# Patient Record
Sex: MALE | Race: WHITE | NOT HISPANIC OR LATINO | Employment: FULL TIME | URBAN - METROPOLITAN AREA
[De-identification: names, ages, dates, MRNs, and addresses within clinical notes are randomized per-mention and may not be internally consistent; named-entity substitution may affect disease eponyms.]

---

## 2019-02-25 ENCOUNTER — HISTORICAL (OUTPATIENT)
Dept: ADMINISTRATIVE | Facility: HOSPITAL | Age: 54
End: 2019-02-25

## 2019-02-25 LAB
ABS NEUT (OLG): 5 X10(3)/MCL (ref 2.1–9.2)
ALBUMIN SERPL-MCNC: 4.4 GM/DL (ref 3.4–5)
ALBUMIN/GLOB SERPL: 1.42 {RATIO} (ref 1.5–2.5)
ALP SERPL-CCNC: 52 UNIT/L (ref 38–126)
ALT SERPL-CCNC: 41 UNIT/L (ref 7–52)
APPEARANCE, UA: CLEAR
AST SERPL-CCNC: 26 UNIT/L (ref 15–37)
BACTERIA #/AREA URNS AUTO: ABNORMAL /HPF
BILIRUB SERPL-MCNC: 0.9 MG/DL (ref 0.2–1)
BILIRUB UR QL STRIP: NEGATIVE MG/DL
BILIRUBIN DIRECT+TOT PNL SERPL-MCNC: 0.2 MG/DL (ref 0–0.5)
BILIRUBIN DIRECT+TOT PNL SERPL-MCNC: 0.7 MG/DL
BUN SERPL-MCNC: 13 MG/DL (ref 7–18)
CALCIUM SERPL-MCNC: 8.8 MG/DL (ref 8.5–10)
CHLORIDE SERPL-SCNC: 103 MMOL/L (ref 98–107)
CHOLEST SERPL-MCNC: 126 MG/DL (ref 0–200)
CHOLEST/HDLC SERPL: 3.7 {RATIO}
CO2 SERPL-SCNC: 24 MMOL/L (ref 21–32)
COLOR UR: ABNORMAL
CREAT SERPL-MCNC: 0.99 MG/DL (ref 0.6–1.3)
ERYTHROCYTE [DISTWIDTH] IN BLOOD BY AUTOMATED COUNT: 12.9 % (ref 11.5–17)
GLOBULIN SER-MCNC: 3.1 GM/DL (ref 1.2–3)
GLUCOSE (UA): NEGATIVE MG/DL
GLUCOSE SERPL-MCNC: 110 MG/DL (ref 74–106)
HCT VFR BLD AUTO: 47.6 % (ref 42–52)
HDLC SERPL-MCNC: 34 MG/DL (ref 35–60)
HGB BLD-MCNC: 15.2 GM/DL (ref 14–18)
HGB UR QL STRIP: NEGATIVE UNIT/L
KETONES UR QL STRIP: NEGATIVE MG/DL
LDLC SERPL CALC-MCNC: 69 MG/DL (ref 0–129)
LEUKOCYTE ESTERASE UR QL STRIP: NEGATIVE UNIT/L
LYMPHOCYTES # BLD AUTO: 2.4 X10(3)/MCL (ref 0.6–3.4)
LYMPHOCYTES NFR BLD AUTO: 29.6 % (ref 13–40)
MCH RBC QN AUTO: 28.9 PG (ref 27–31.2)
MCHC RBC AUTO-ENTMCNC: 32 GM/DL (ref 32–36)
MCV RBC AUTO: 90 FL (ref 80–94)
MONOCYTES # BLD AUTO: 0.8 X10(3)/MCL (ref 0.1–1.3)
MONOCYTES NFR BLD AUTO: 10 % (ref 0.1–24)
NEUTROPHILS NFR BLD AUTO: 60.4 % (ref 47–80)
NITRITE UR QL STRIP.AUTO: NEGATIVE
PH UR STRIP: 5.5 [PH]
PLATELET # BLD AUTO: 277 X10(3)/MCL (ref 130–400)
PMV BLD AUTO: 9.3 FL (ref 9.4–12.4)
POTASSIUM SERPL-SCNC: 4.3 MMOL/L (ref 3.5–5.1)
PROT SERPL-MCNC: 7.5 GM/DL (ref 6.4–8.2)
PROT UR QL STRIP: NEGATIVE MG/DL
PSA SERPL-MCNC: 0.86 NG/ML (ref 0–3.5)
RBC # BLD AUTO: 5.26 X10(6)/MCL (ref 4.7–6.1)
RBC #/AREA URNS HPF: ABNORMAL /HPF
SODIUM SERPL-SCNC: 138 MMOL/L (ref 136–145)
SP GR UR STRIP: >1.03
SQUAMOUS EPITHELIAL, UA: ABNORMAL /LPF
TRIGL SERPL-MCNC: 81 MG/DL (ref 30–150)
UROBILINOGEN UR STRIP-ACNC: 0.2 MG/DL
VLDLC SERPL CALC-MCNC: 16.2 MG/DL
WBC # SPEC AUTO: 8.2 X10(3)/MCL (ref 4.5–11.5)
WBC #/AREA URNS AUTO: ABNORMAL /[HPF]

## 2020-06-25 ENCOUNTER — HISTORICAL (OUTPATIENT)
Dept: ADMINISTRATIVE | Facility: HOSPITAL | Age: 55
End: 2020-06-25

## 2020-06-25 LAB
ABS NEUT (OLG): 5.1 X10(3)/MCL (ref 2.1–9.2)
ALBUMIN SERPL-MCNC: 4.6 GM/DL (ref 3.4–5)
ALBUMIN/GLOB SERPL: 1.39 {RATIO} (ref 1.5–2.5)
ALP SERPL-CCNC: 54 UNIT/L (ref 38–126)
ALT SERPL-CCNC: 38 UNIT/L (ref 7–52)
APPEARANCE, UA: ABNORMAL
AST SERPL-CCNC: 25 UNIT/L (ref 15–37)
BACTERIA #/AREA URNS AUTO: ABNORMAL /HPF
BILIRUB SERPL-MCNC: 0.9 MG/DL (ref 0.2–1)
BILIRUB UR QL STRIP: NEGATIVE MG/DL
BILIRUBIN DIRECT+TOT PNL SERPL-MCNC: 0.2 MG/DL (ref 0–0.5)
BILIRUBIN DIRECT+TOT PNL SERPL-MCNC: 0.7 MG/DL
BUN SERPL-MCNC: 10 MG/DL (ref 7–18)
CALCIUM SERPL-MCNC: 9.3 MG/DL (ref 8.5–10)
CHLORIDE SERPL-SCNC: 103 MMOL/L (ref 98–107)
CHOLEST SERPL-MCNC: 111 MG/DL (ref 0–200)
CHOLEST/HDLC SERPL: 3.1 {RATIO}
CO2 SERPL-SCNC: 25 MMOL/L (ref 21–32)
COLOR UR: ABNORMAL
CREAT SERPL-MCNC: 1.06 MG/DL (ref 0.6–1.3)
ERYTHROCYTE [DISTWIDTH] IN BLOOD BY AUTOMATED COUNT: 12.9 % (ref 11.5–17)
EST. AVERAGE GLUCOSE BLD GHB EST-MCNC: 120 MG/DL
GLOBULIN SER-MCNC: 3.3 GM/DL (ref 1.2–3)
GLUCOSE (UA): NEGATIVE MG/DL
GLUCOSE SERPL-MCNC: 99 MG/DL (ref 74–106)
HBA1C MFR BLD: 5.8 % (ref 4.4–6.4)
HCT VFR BLD AUTO: 48.4 % (ref 42–52)
HDLC SERPL-MCNC: 36 MG/DL (ref 35–60)
HGB BLD-MCNC: 15.9 GM/DL (ref 14–18)
HGB UR QL STRIP: NEGATIVE UNIT/L
KETONES UR QL STRIP: NEGATIVE MG/DL
LDLC SERPL CALC-MCNC: 64 MG/DL (ref 0–129)
LEUKOCYTE ESTERASE UR QL STRIP: NEGATIVE UNIT/L
LYMPHOCYTES # BLD AUTO: 1 X10(3)/MCL (ref 0.6–3.4)
LYMPHOCYTES NFR BLD AUTO: 13.9 % (ref 13–40)
MCH RBC QN AUTO: 28.1 PG (ref 27–31.2)
MCHC RBC AUTO-ENTMCNC: 33 GM/DL (ref 32–36)
MCV RBC AUTO: 86 FL (ref 80–94)
MONOCYTES # BLD AUTO: 1.1 X10(3)/MCL (ref 0.1–1.3)
MONOCYTES NFR BLD AUTO: 15.3 % (ref 0.1–24)
NEUTROPHILS NFR BLD AUTO: 70.8 % (ref 47–80)
NITRITE UR QL STRIP.AUTO: NEGATIVE
PH UR STRIP: 5.5 [PH]
PLATELET # BLD AUTO: 288 X10(3)/MCL (ref 130–400)
PMV BLD AUTO: 9.1 FL (ref 9.4–12.4)
POTASSIUM SERPL-SCNC: 4.4 MMOL/L (ref 3.5–5.1)
PROT SERPL-MCNC: 7.9 GM/DL (ref 6.4–8.2)
PROT UR QL STRIP: NEGATIVE MG/DL
PSA SERPL-MCNC: 1.72 NG/ML (ref 0–3.5)
RBC # BLD AUTO: 5.65 X10(6)/MCL (ref 4.7–6.1)
RBC #/AREA URNS HPF: ABNORMAL /HPF
SODIUM SERPL-SCNC: 139 MMOL/L (ref 136–145)
SP GR UR STRIP: >1.03
SQUAMOUS EPITHELIAL, UA: ABNORMAL /LPF
TRIGL SERPL-MCNC: 81 MG/DL (ref 30–150)
UROBILINOGEN UR STRIP-ACNC: 0.2 MG/DL
VLDLC SERPL CALC-MCNC: 16.2 MG/DL
WBC # SPEC AUTO: 7.2 X10(3)/MCL (ref 4.5–11.5)
WBC #/AREA URNS AUTO: ABNORMAL /[HPF]

## 2021-07-16 ENCOUNTER — HISTORICAL (OUTPATIENT)
Dept: ADMINISTRATIVE | Facility: HOSPITAL | Age: 56
End: 2021-07-16

## 2021-07-16 LAB
ABS NEUT (OLG): 5.2 X10(3)/MCL (ref 2.1–9.2)
ALBUMIN SERPL-MCNC: 4.8 GM/DL (ref 3.4–5)
ALBUMIN/GLOB SERPL: 1.37 {RATIO} (ref 1.5–2.5)
ALP SERPL-CCNC: 57 UNIT/L (ref 38–126)
ALT SERPL-CCNC: 42 UNIT/L (ref 7–52)
APPEARANCE, UA: CLEAR
AST SERPL-CCNC: 30 UNIT/L (ref 15–37)
BACTERIA #/AREA URNS AUTO: ABNORMAL /HPF
BILIRUB SERPL-MCNC: 0.8 MG/DL (ref 0.2–1)
BILIRUB UR QL STRIP: NEGATIVE MG/DL
BILIRUBIN DIRECT+TOT PNL SERPL-MCNC: 0.2 MG/DL (ref 0–0.5)
BILIRUBIN DIRECT+TOT PNL SERPL-MCNC: 0.6 MG/DL
BUN SERPL-MCNC: 11 MG/DL (ref 7–18)
CALCIUM SERPL-MCNC: 10.4 MG/DL (ref 8.5–10.1)
CHLORIDE SERPL-SCNC: 103 MMOL/L (ref 98–107)
CHOLEST SERPL-MCNC: 149 MG/DL (ref 0–200)
CHOLEST/HDLC SERPL: 3.5 {RATIO}
CO2 SERPL-SCNC: 28 MMOL/L (ref 21–32)
COLOR UR: YELLOW
CREAT SERPL-MCNC: 0.99 MG/DL (ref 0.6–1.3)
ERYTHROCYTE [DISTWIDTH] IN BLOOD BY AUTOMATED COUNT: 13.7 % (ref 11.5–17)
EST. AVERAGE GLUCOSE BLD GHB EST-MCNC: 114 MG/DL
GLOBULIN SER-MCNC: 3.5 GM/DL (ref 1.2–3)
GLUCOSE (UA): NEGATIVE MG/DL
GLUCOSE SERPL-MCNC: 113 MG/DL (ref 74–106)
HBA1C MFR BLD: 5.6 % (ref 4.4–6.4)
HCT VFR BLD AUTO: 50.8 % (ref 42–52)
HDLC SERPL-MCNC: 42 MG/DL (ref 35–60)
HGB BLD-MCNC: 16.5 GM/DL (ref 14–18)
HGB UR QL STRIP: NEGATIVE UNIT/L
KETONES UR QL STRIP: NEGATIVE MG/DL
LDLC SERPL CALC-MCNC: 102 MG/DL (ref 0–129)
LEUKOCYTE ESTERASE UR QL STRIP: NEGATIVE UNIT/L
LYMPHOCYTES # BLD AUTO: 2.2 X10(3)/MCL (ref 0.6–3.4)
LYMPHOCYTES NFR BLD AUTO: 27.4 % (ref 13–40)
MCH RBC QN AUTO: 27.8 PG (ref 27–31.2)
MCHC RBC AUTO-ENTMCNC: 32 GM/DL (ref 32–36)
MCV RBC AUTO: 86 FL (ref 80–94)
MONOCYTES # BLD AUTO: 0.5 X10(3)/MCL (ref 0.1–1.3)
MONOCYTES NFR BLD AUTO: 6.8 % (ref 0.1–24)
NEUTROPHILS NFR BLD AUTO: 65.8 % (ref 47–80)
NITRITE UR QL STRIP.AUTO: NEGATIVE
PH UR STRIP: 5.5 [PH]
PLATELET # BLD AUTO: 309 X10(3)/MCL (ref 130–400)
PMV BLD AUTO: 9.4 FL (ref 9.4–12.4)
POTASSIUM SERPL-SCNC: 5.3 MMOL/L (ref 3.5–5.1)
PROT SERPL-MCNC: 8.3 GM/DL (ref 6.4–8.2)
PROT UR QL STRIP: NEGATIVE MG/DL
PSA SERPL-MCNC: 2.08 NG/ML (ref 0–3.5)
RBC # BLD AUTO: 5.94 X10(6)/MCL (ref 4.7–6.1)
RBC #/AREA URNS HPF: ABNORMAL /HPF
SODIUM SERPL-SCNC: 140 MMOL/L (ref 136–145)
SP GR UR STRIP: >1.03
SQUAMOUS EPITHELIAL, UA: ABNORMAL /LPF
TRIGL SERPL-MCNC: 92 MG/DL (ref 30–150)
UROBILINOGEN UR STRIP-ACNC: 0.2 MG/DL
VLDLC SERPL CALC-MCNC: 18.4 MG/DL
WBC # SPEC AUTO: 7.9 X10(3)/MCL (ref 4.5–11.5)
WBC #/AREA URNS AUTO: ABNORMAL /[HPF]

## 2021-08-04 ENCOUNTER — HISTORICAL (OUTPATIENT)
Dept: ADMINISTRATIVE | Facility: HOSPITAL | Age: 56
End: 2021-08-04

## 2021-08-04 LAB
BUN SERPL-MCNC: 12 MG/DL (ref 7–18)
CALCIUM SERPL-MCNC: 9.1 MG/DL (ref 8.5–10.1)
CHLORIDE SERPL-SCNC: 104 MMOL/L (ref 98–107)
CO2 SERPL-SCNC: 28 MMOL/L (ref 21–32)
CREAT SERPL-MCNC: 0.93 MG/DL (ref 0.6–1.3)
CREAT/UREA NIT SERPL: 12.9
GLUCOSE SERPL-MCNC: 169 MG/DL (ref 74–106)
POTASSIUM SERPL-SCNC: 5 MMOL/L (ref 3.5–5.1)
SODIUM SERPL-SCNC: 138 MMOL/L (ref 136–145)

## 2022-04-10 ENCOUNTER — HISTORICAL (OUTPATIENT)
Dept: ADMINISTRATIVE | Facility: HOSPITAL | Age: 57
End: 2022-04-10

## 2022-04-27 VITALS
HEIGHT: 72 IN | SYSTOLIC BLOOD PRESSURE: 136 MMHG | BODY MASS INDEX: 34.31 KG/M2 | WEIGHT: 253.31 LBS | DIASTOLIC BLOOD PRESSURE: 78 MMHG

## 2022-07-23 PROBLEM — Z23 IMMUNIZATION DUE: Status: ACTIVE | Noted: 2022-07-23

## 2022-07-23 PROBLEM — E66.9 OBESITY: Status: ACTIVE | Noted: 2022-07-23

## 2022-07-23 PROBLEM — R73.9 HYPERGLYCEMIA: Status: ACTIVE | Noted: 2022-07-23

## 2022-07-23 PROBLEM — Z12.5 PROSTATE CANCER SCREENING: Status: ACTIVE | Noted: 2022-07-23

## 2022-07-23 PROBLEM — I10 PRIMARY HYPERTENSION: Status: ACTIVE | Noted: 2022-07-23

## 2022-07-23 PROBLEM — Z00.00 ENCOUNTER FOR WELLNESS EXAMINATION IN ADULT: Status: ACTIVE | Noted: 2022-07-23

## 2022-09-21 ENCOUNTER — HOSPITAL ENCOUNTER (OUTPATIENT)
Facility: HOSPITAL | Age: 57
Discharge: HOME | End: 2022-09-21
Attending: EMERGENCY MEDICINE | Admitting: COLON & RECTAL SURGERY
Payer: COMMERCIAL

## 2022-09-21 ENCOUNTER — ANESTHESIA EVENT (OUTPATIENT)
Dept: SURGERY | Facility: HOSPITAL | Age: 57
End: 2022-09-21
Payer: COMMERCIAL

## 2022-09-21 ENCOUNTER — ANESTHESIA (OUTPATIENT)
Dept: SURGERY | Facility: HOSPITAL | Age: 57
End: 2022-09-21
Payer: COMMERCIAL

## 2022-09-21 DIAGNOSIS — R07.9 CHEST PAIN: ICD-10-CM

## 2022-09-21 DIAGNOSIS — R10.13 ACUTE EPIGASTRIC PAIN: ICD-10-CM

## 2022-09-21 DIAGNOSIS — K80.10 CALCULUS OF GALLBLADDER WITH CHOLECYSTITIS WITHOUT BILIARY OBSTRUCTION, UNSPECIFIED CHOLECYSTITIS ACUITY: Primary | ICD-10-CM

## 2022-09-21 DIAGNOSIS — K82.8 DYSFUNCTIONAL GALLBLADDER: ICD-10-CM

## 2022-09-21 LAB
ALBUMIN SERPL-MCNC: 4.3 GM/DL (ref 3.5–5)
ALBUMIN/GLOB SERPL: 1.1 RATIO (ref 1.1–2)
ALP SERPL-CCNC: 75 UNIT/L (ref 40–150)
ALT SERPL-CCNC: 26 UNIT/L (ref 0–55)
AST SERPL-CCNC: 20 UNIT/L (ref 5–34)
BASOPHILS # BLD AUTO: 0.05 X10(3)/MCL (ref 0–0.2)
BASOPHILS NFR BLD AUTO: 0.5 %
BILIRUBIN DIRECT+TOT PNL SERPL-MCNC: 0.6 MG/DL
BNP BLD-MCNC: 41.4 PG/ML
BUN SERPL-MCNC: 15.9 MG/DL (ref 8.4–25.7)
CALCIUM SERPL-MCNC: 9.7 MG/DL (ref 8.4–10.2)
CHLORIDE SERPL-SCNC: 107 MMOL/L (ref 98–107)
CO2 SERPL-SCNC: 20 MMOL/L (ref 22–29)
CREAT SERPL-MCNC: 0.97 MG/DL (ref 0.73–1.18)
EOSINOPHIL # BLD AUTO: 0.03 X10(3)/MCL (ref 0–0.9)
EOSINOPHIL NFR BLD AUTO: 0.3 %
ERYTHROCYTE [DISTWIDTH] IN BLOOD BY AUTOMATED COUNT: 13.6 % (ref 11.5–17)
GFR SERPLBLD CREATININE-BSD FMLA CKD-EPI: >60 MLS/MIN/1.73/M2
GLOBULIN SER-MCNC: 3.9 GM/DL (ref 2.4–3.5)
GLUCOSE SERPL-MCNC: 121 MG/DL (ref 74–100)
HCT VFR BLD AUTO: 49.8 % (ref 42–52)
HGB BLD-MCNC: 16.3 GM/DL (ref 14–18)
IMM GRANULOCYTES # BLD AUTO: 0.06 X10(3)/MCL (ref 0–0.04)
IMM GRANULOCYTES NFR BLD AUTO: 0.6 %
INR BLD: 1.05 (ref 0–1.3)
LIPASE SERPL-CCNC: 17 U/L
LYMPHOCYTES # BLD AUTO: 1.56 X10(3)/MCL (ref 0.6–4.6)
LYMPHOCYTES NFR BLD AUTO: 15.7 %
MCH RBC QN AUTO: 28.5 PG (ref 27–31)
MCHC RBC AUTO-ENTMCNC: 32.7 MG/DL (ref 33–36)
MCV RBC AUTO: 87.1 FL (ref 80–94)
MONOCYTES # BLD AUTO: 0.51 X10(3)/MCL (ref 0.1–1.3)
MONOCYTES NFR BLD AUTO: 5.1 %
NEUTROPHILS # BLD AUTO: 7.7 X10(3)/MCL (ref 2.1–9.2)
NEUTROPHILS NFR BLD AUTO: 77.8 %
NRBC BLD AUTO-RTO: 0 %
PLATELET # BLD AUTO: 232 X10(3)/MCL (ref 130–400)
PMV BLD AUTO: 11 FL (ref 7.4–10.4)
POTASSIUM SERPL-SCNC: 4.4 MMOL/L (ref 3.5–5.1)
PROT SERPL-MCNC: 8.2 GM/DL (ref 6.4–8.3)
PROTHROMBIN TIME: 13.6 SECONDS (ref 12.5–14.5)
RBC # BLD AUTO: 5.72 X10(6)/MCL (ref 4.7–6.1)
SODIUM SERPL-SCNC: 139 MMOL/L (ref 136–145)
TROPONIN I SERPL-MCNC: <0.01 NG/ML (ref 0–0.04)
TROPONIN I SERPL-MCNC: <0.01 NG/ML (ref 0–0.04)
WBC # SPEC AUTO: 9.9 X10(3)/MCL (ref 4.5–11.5)

## 2022-09-21 PROCEDURE — 63600175 PHARM REV CODE 636 W HCPCS: Performed by: NURSE ANESTHETIST, CERTIFIED REGISTERED

## 2022-09-21 PROCEDURE — 93010 EKG 12-LEAD: ICD-10-PCS | Mod: ,,, | Performed by: INTERNAL MEDICINE

## 2022-09-21 PROCEDURE — 25000242 PHARM REV CODE 250 ALT 637 W/ HCPCS

## 2022-09-21 PROCEDURE — 27201423 OPTIME MED/SURG SUP & DEVICES STERILE SUPPLY: Performed by: COLON & RECTAL SURGERY

## 2022-09-21 PROCEDURE — 84484 ASSAY OF TROPONIN QUANT: CPT | Performed by: EMERGENCY MEDICINE

## 2022-09-21 PROCEDURE — 85025 COMPLETE CBC W/AUTO DIFF WBC: CPT | Performed by: EMERGENCY MEDICINE

## 2022-09-21 PROCEDURE — G0378 HOSPITAL OBSERVATION PER HR: HCPCS

## 2022-09-21 PROCEDURE — 25000003 PHARM REV CODE 250: Performed by: COLON & RECTAL SURGERY

## 2022-09-21 PROCEDURE — 71000015 HC POSTOP RECOV 1ST HR: Performed by: COLON & RECTAL SURGERY

## 2022-09-21 PROCEDURE — 96374 THER/PROPH/DIAG INJ IV PUSH: CPT | Mod: 59

## 2022-09-21 PROCEDURE — 99285 EMERGENCY DEPT VISIT HI MDM: CPT | Mod: 25

## 2022-09-21 PROCEDURE — 71000039 HC RECOVERY, EACH ADD'L HOUR: Performed by: COLON & RECTAL SURGERY

## 2022-09-21 PROCEDURE — 85610 PROTHROMBIN TIME: CPT | Performed by: EMERGENCY MEDICINE

## 2022-09-21 PROCEDURE — 83880 ASSAY OF NATRIURETIC PEPTIDE: CPT | Performed by: EMERGENCY MEDICINE

## 2022-09-21 PROCEDURE — 63600175 PHARM REV CODE 636 W HCPCS: Performed by: EMERGENCY MEDICINE

## 2022-09-21 PROCEDURE — 25000003 PHARM REV CODE 250: Performed by: EMERGENCY MEDICINE

## 2022-09-21 PROCEDURE — 93005 ELECTROCARDIOGRAM TRACING: CPT

## 2022-09-21 PROCEDURE — 71000016 HC POSTOP RECOV ADDL HR: Performed by: COLON & RECTAL SURGERY

## 2022-09-21 PROCEDURE — 36415 COLL VENOUS BLD VENIPUNCTURE: CPT | Performed by: EMERGENCY MEDICINE

## 2022-09-21 PROCEDURE — 37000009 HC ANESTHESIA EA ADD 15 MINS: Performed by: COLON & RECTAL SURGERY

## 2022-09-21 PROCEDURE — 80053 COMPREHEN METABOLIC PANEL: CPT | Performed by: EMERGENCY MEDICINE

## 2022-09-21 PROCEDURE — 96375 TX/PRO/DX INJ NEW DRUG ADDON: CPT | Mod: 59

## 2022-09-21 PROCEDURE — 25500020 PHARM REV CODE 255: Performed by: EMERGENCY MEDICINE

## 2022-09-21 PROCEDURE — 25000242 PHARM REV CODE 250 ALT 637 W/ HCPCS: Performed by: EMERGENCY MEDICINE

## 2022-09-21 PROCEDURE — 36000709 HC OR TIME LEV III EA ADD 15 MIN: Performed by: COLON & RECTAL SURGERY

## 2022-09-21 PROCEDURE — 36000708 HC OR TIME LEV III 1ST 15 MIN: Performed by: COLON & RECTAL SURGERY

## 2022-09-21 PROCEDURE — 71000033 HC RECOVERY, INTIAL HOUR: Performed by: COLON & RECTAL SURGERY

## 2022-09-21 PROCEDURE — 83690 ASSAY OF LIPASE: CPT | Performed by: EMERGENCY MEDICINE

## 2022-09-21 PROCEDURE — 93010 ELECTROCARDIOGRAM REPORT: CPT | Mod: ,,, | Performed by: INTERNAL MEDICINE

## 2022-09-21 PROCEDURE — 25000003 PHARM REV CODE 250: Performed by: NURSE ANESTHETIST, CERTIFIED REGISTERED

## 2022-09-21 PROCEDURE — 37000008 HC ANESTHESIA 1ST 15 MINUTES: Performed by: COLON & RECTAL SURGERY

## 2022-09-21 RX ORDER — HYDROMORPHONE HYDROCHLORIDE 2 MG/ML
0.2 INJECTION, SOLUTION INTRAMUSCULAR; INTRAVENOUS; SUBCUTANEOUS EVERY 5 MIN PRN
Status: DISCONTINUED | OUTPATIENT
Start: 2022-09-21 | End: 2022-09-21 | Stop reason: HOSPADM

## 2022-09-21 RX ORDER — ONDANSETRON 2 MG/ML
4 INJECTION INTRAMUSCULAR; INTRAVENOUS ONCE
Status: DISCONTINUED | OUTPATIENT
Start: 2022-09-21 | End: 2022-09-21 | Stop reason: HOSPADM

## 2022-09-21 RX ORDER — OXYCODONE HYDROCHLORIDE 5 MG/1
5 TABLET ORAL EVERY 4 HOURS PRN
Status: DISCONTINUED | OUTPATIENT
Start: 2022-09-21 | End: 2022-09-21 | Stop reason: HOSPADM

## 2022-09-21 RX ORDER — ROCURONIUM BROMIDE 10 MG/ML
INJECTION, SOLUTION INTRAVENOUS
Status: DISCONTINUED | OUTPATIENT
Start: 2022-09-21 | End: 2022-09-21

## 2022-09-21 RX ORDER — IPRATROPIUM BROMIDE AND ALBUTEROL SULFATE 2.5; .5 MG/3ML; MG/3ML
SOLUTION RESPIRATORY (INHALATION)
Status: COMPLETED
Start: 2022-09-21 | End: 2022-09-21

## 2022-09-21 RX ORDER — MORPHINE SULFATE 4 MG/ML
4 INJECTION, SOLUTION INTRAMUSCULAR; INTRAVENOUS
Status: COMPLETED | OUTPATIENT
Start: 2022-09-21 | End: 2022-09-21

## 2022-09-21 RX ORDER — BUPIVACAINE HYDROCHLORIDE AND EPINEPHRINE 2.5; 5 MG/ML; UG/ML
INJECTION, SOLUTION EPIDURAL; INFILTRATION; INTRACAUDAL; PERINEURAL
Status: DISCONTINUED | OUTPATIENT
Start: 2022-09-21 | End: 2022-09-21 | Stop reason: HOSPADM

## 2022-09-21 RX ORDER — MAG HYDROX/ALUMINUM HYD/SIMETH 200-200-20
30 SUSPENSION, ORAL (FINAL DOSE FORM) ORAL ONCE
Status: COMPLETED | OUTPATIENT
Start: 2022-09-21 | End: 2022-09-21

## 2022-09-21 RX ORDER — KETOROLAC TROMETHAMINE 30 MG/ML
INJECTION, SOLUTION INTRAMUSCULAR; INTRAVENOUS
Status: DISCONTINUED | OUTPATIENT
Start: 2022-09-21 | End: 2022-09-21

## 2022-09-21 RX ORDER — ONDANSETRON 4 MG/1
8 TABLET, ORALLY DISINTEGRATING ORAL EVERY 8 HOURS PRN
Status: DISCONTINUED | OUTPATIENT
Start: 2022-09-21 | End: 2022-09-21 | Stop reason: HOSPADM

## 2022-09-21 RX ORDER — PROMETHAZINE HYDROCHLORIDE 25 MG/1
25 SUPPOSITORY RECTAL EVERY 6 HOURS PRN
Status: DISCONTINUED | OUTPATIENT
Start: 2022-09-21 | End: 2022-09-21 | Stop reason: HOSPADM

## 2022-09-21 RX ORDER — NITROGLYCERIN 0.4 MG/1
0.4 TABLET SUBLINGUAL EVERY 5 MIN PRN
Status: DISCONTINUED | OUTPATIENT
Start: 2022-09-21 | End: 2022-09-21 | Stop reason: HOSPADM

## 2022-09-21 RX ORDER — NITROGLYCERIN 0.4 MG/1
0.4 TABLET SUBLINGUAL
Status: COMPLETED | OUTPATIENT
Start: 2022-09-21 | End: 2022-09-21

## 2022-09-21 RX ORDER — CEFAZOLIN SODIUM 1 G/3ML
INJECTION, POWDER, FOR SOLUTION INTRAMUSCULAR; INTRAVENOUS
Status: DISCONTINUED | OUTPATIENT
Start: 2022-09-21 | End: 2022-09-21

## 2022-09-21 RX ORDER — LIDOCAINE HYDROCHLORIDE 20 MG/ML
15 SOLUTION OROPHARYNGEAL ONCE
Status: COMPLETED | OUTPATIENT
Start: 2022-09-21 | End: 2022-09-21

## 2022-09-21 RX ORDER — GLYCOPYRROLATE 0.2 MG/ML
INJECTION INTRAMUSCULAR; INTRAVENOUS
Status: DISCONTINUED | OUTPATIENT
Start: 2022-09-21 | End: 2022-09-21

## 2022-09-21 RX ORDER — CEFAZOLIN SODIUM 1 G/3ML
INJECTION, POWDER, FOR SOLUTION INTRAMUSCULAR; INTRAVENOUS
Status: COMPLETED
Start: 2022-09-21 | End: 2022-09-21

## 2022-09-21 RX ORDER — ACETAMINOPHEN 10 MG/ML
INJECTION, SOLUTION INTRAVENOUS
Status: DISCONTINUED | OUTPATIENT
Start: 2022-09-21 | End: 2022-09-21

## 2022-09-21 RX ORDER — SODIUM CHLORIDE 9 MG/ML
INJECTION, SOLUTION INTRAVENOUS CONTINUOUS
Status: DISCONTINUED | OUTPATIENT
Start: 2022-09-21 | End: 2022-09-21

## 2022-09-21 RX ORDER — AMLODIPINE BESYLATE 5 MG/1
10 TABLET ORAL DAILY
Status: DISCONTINUED | OUTPATIENT
Start: 2022-09-21 | End: 2022-09-21 | Stop reason: HOSPADM

## 2022-09-21 RX ORDER — ACETAMINOPHEN 325 MG/1
650 TABLET ORAL EVERY 4 HOURS
Status: DISCONTINUED | OUTPATIENT
Start: 2022-09-21 | End: 2022-09-21 | Stop reason: HOSPADM

## 2022-09-21 RX ORDER — OXYCODONE HYDROCHLORIDE 5 MG/1
5 TABLET ORAL EVERY 4 HOURS PRN
Qty: 10 TABLET | Refills: 0 | Status: SHIPPED | OUTPATIENT
Start: 2022-09-21 | End: 2022-10-26

## 2022-09-21 RX ORDER — ACETAMINOPHEN 325 MG/1
650 TABLET ORAL EVERY 8 HOURS PRN
Status: DISCONTINUED | OUTPATIENT
Start: 2022-09-21 | End: 2022-09-21 | Stop reason: HOSPADM

## 2022-09-21 RX ORDER — IPRATROPIUM BROMIDE AND ALBUTEROL SULFATE 2.5; .5 MG/3ML; MG/3ML
3 SOLUTION RESPIRATORY (INHALATION) ONCE
Status: COMPLETED | OUTPATIENT
Start: 2022-09-21 | End: 2022-09-21

## 2022-09-21 RX ORDER — OXYCODONE HYDROCHLORIDE 5 MG/1
10 TABLET ORAL EVERY 4 HOURS PRN
Status: DISCONTINUED | OUTPATIENT
Start: 2022-09-21 | End: 2022-09-21 | Stop reason: HOSPADM

## 2022-09-21 RX ORDER — TALC
6 POWDER (GRAM) TOPICAL NIGHTLY PRN
Status: DISCONTINUED | OUTPATIENT
Start: 2022-09-21 | End: 2022-09-21 | Stop reason: HOSPADM

## 2022-09-21 RX ORDER — HYDROCHLOROTHIAZIDE 25 MG/1
25 TABLET ORAL DAILY
Status: DISCONTINUED | OUTPATIENT
Start: 2022-09-21 | End: 2022-09-21 | Stop reason: HOSPADM

## 2022-09-21 RX ORDER — DEXAMETHASONE SODIUM PHOSPHATE 4 MG/ML
INJECTION, SOLUTION INTRA-ARTICULAR; INTRALESIONAL; INTRAMUSCULAR; INTRAVENOUS; SOFT TISSUE
Status: DISCONTINUED | OUTPATIENT
Start: 2022-09-21 | End: 2022-09-21

## 2022-09-21 RX ORDER — HYDROMORPHONE HYDROCHLORIDE 2 MG/ML
0.5 INJECTION, SOLUTION INTRAMUSCULAR; INTRAVENOUS; SUBCUTANEOUS EVERY 5 MIN PRN
Status: DISCONTINUED | OUTPATIENT
Start: 2022-09-21 | End: 2022-09-21 | Stop reason: HOSPADM

## 2022-09-21 RX ORDER — ENOXAPARIN SODIUM 100 MG/ML
40 INJECTION SUBCUTANEOUS EVERY 24 HOURS
Status: DISCONTINUED | OUTPATIENT
Start: 2022-09-21 | End: 2022-09-21 | Stop reason: HOSPADM

## 2022-09-21 RX ORDER — METOPROLOL SUCCINATE 50 MG/1
200 TABLET, EXTENDED RELEASE ORAL DAILY
Status: DISCONTINUED | OUTPATIENT
Start: 2022-09-21 | End: 2022-09-21 | Stop reason: HOSPADM

## 2022-09-21 RX ORDER — PROPOFOL 10 MG/ML
VIAL (ML) INTRAVENOUS
Status: DISCONTINUED | OUTPATIENT
Start: 2022-09-21 | End: 2022-09-21

## 2022-09-21 RX ORDER — FENTANYL CITRATE 50 UG/ML
INJECTION, SOLUTION INTRAMUSCULAR; INTRAVENOUS
Status: DISCONTINUED | OUTPATIENT
Start: 2022-09-21 | End: 2022-09-21

## 2022-09-21 RX ORDER — MEPERIDINE HYDROCHLORIDE 25 MG/ML
12.5 INJECTION INTRAMUSCULAR; INTRAVENOUS; SUBCUTANEOUS ONCE
Status: DISCONTINUED | OUTPATIENT
Start: 2022-09-21 | End: 2022-09-21 | Stop reason: HOSPADM

## 2022-09-21 RX ORDER — ONDANSETRON 2 MG/ML
INJECTION INTRAMUSCULAR; INTRAVENOUS
Status: DISCONTINUED | OUTPATIENT
Start: 2022-09-21 | End: 2022-09-21

## 2022-09-21 RX ORDER — ASPIRIN 325 MG
325 TABLET ORAL
Status: COMPLETED | OUTPATIENT
Start: 2022-09-21 | End: 2022-09-21

## 2022-09-21 RX ORDER — PHENYLEPHRINE HYDROCHLORIDE 10 MG/ML
INJECTION INTRAVENOUS
Status: DISCONTINUED | OUTPATIENT
Start: 2022-09-21 | End: 2022-09-21

## 2022-09-21 RX ORDER — ONDANSETRON 2 MG/ML
4 INJECTION INTRAMUSCULAR; INTRAVENOUS
Status: COMPLETED | OUTPATIENT
Start: 2022-09-21 | End: 2022-09-21

## 2022-09-21 RX ORDER — HYDROMORPHONE HYDROCHLORIDE 2 MG/ML
1 INJECTION, SOLUTION INTRAMUSCULAR; INTRAVENOUS; SUBCUTANEOUS
Status: COMPLETED | OUTPATIENT
Start: 2022-09-21 | End: 2022-09-21

## 2022-09-21 RX ORDER — DIPHENHYDRAMINE HYDROCHLORIDE 50 MG/ML
25 INJECTION INTRAMUSCULAR; INTRAVENOUS EVERY 6 HOURS PRN
Status: DISCONTINUED | OUTPATIENT
Start: 2022-09-21 | End: 2022-09-21 | Stop reason: HOSPADM

## 2022-09-21 RX ORDER — MIDAZOLAM HYDROCHLORIDE 1 MG/ML
INJECTION INTRAMUSCULAR; INTRAVENOUS
Status: DISCONTINUED | OUTPATIENT
Start: 2022-09-21 | End: 2022-09-21

## 2022-09-21 RX ADMIN — ROCURONIUM BROMIDE 70 MG: 10 INJECTION, SOLUTION INTRAVENOUS at 12:09

## 2022-09-21 RX ADMIN — CEFAZOLIN 2 G: 330 INJECTION, POWDER, FOR SOLUTION INTRAMUSCULAR; INTRAVENOUS at 12:09

## 2022-09-21 RX ADMIN — NITROGLYCERIN 1 INCH: 20 OINTMENT TOPICAL at 06:09

## 2022-09-21 RX ADMIN — NITROGLYCERIN 0.4 MG: 0.4 TABLET, ORALLY DISINTEGRATING SUBLINGUAL at 06:09

## 2022-09-21 RX ADMIN — KETOROLAC TROMETHAMINE 30 MG: 30 INJECTION, SOLUTION INTRAMUSCULAR at 01:09

## 2022-09-21 RX ADMIN — SUGAMMADEX 231 MG: 100 INJECTION, SOLUTION INTRAVENOUS at 01:09

## 2022-09-21 RX ADMIN — GLYCOPYRROLATE 0.2 MG: 0.2 INJECTION INTRAMUSCULAR; INTRAVENOUS at 12:09

## 2022-09-21 RX ADMIN — IOPAMIDOL 100 ML: 755 INJECTION, SOLUTION INTRAVENOUS at 07:09

## 2022-09-21 RX ADMIN — ASPIRIN 325 MG ORAL TABLET 325 MG: 325 PILL ORAL at 06:09

## 2022-09-21 RX ADMIN — DEXAMETHASONE SODIUM PHOSPHATE 4 MG: 4 INJECTION, SOLUTION INTRA-ARTICULAR; INTRALESIONAL; INTRAMUSCULAR; INTRAVENOUS; SOFT TISSUE at 12:09

## 2022-09-21 RX ADMIN — MIDAZOLAM HYDROCHLORIDE 2 MG: 1 INJECTION, SOLUTION INTRAMUSCULAR; INTRAVENOUS at 12:09

## 2022-09-21 RX ADMIN — PHENYLEPHRINE HYDROCHLORIDE 100 MCG: 10 INJECTION INTRAVENOUS at 12:09

## 2022-09-21 RX ADMIN — GLYCOPYRROLATE 0.2 MG: 0.2 INJECTION INTRAMUSCULAR; INTRAVENOUS at 01:09

## 2022-09-21 RX ADMIN — HYDROMORPHONE HYDROCHLORIDE 1 MG: 2 INJECTION INTRAMUSCULAR; INTRAVENOUS; SUBCUTANEOUS at 08:09

## 2022-09-21 RX ADMIN — ONDANSETRON 4 MG: 2 INJECTION INTRAMUSCULAR; INTRAVENOUS at 06:09

## 2022-09-21 RX ADMIN — PROPOFOL 200 MG: 10 INJECTION, EMULSION INTRAVENOUS at 12:09

## 2022-09-21 RX ADMIN — IPRATROPIUM BROMIDE AND ALBUTEROL SULFATE 3 ML: 2.5; .5 SOLUTION RESPIRATORY (INHALATION) at 02:09

## 2022-09-21 RX ADMIN — LIDOCAINE HYDROCHLORIDE 15 ML: 20 SOLUTION ORAL; TOPICAL at 06:09

## 2022-09-21 RX ADMIN — ACETAMINOPHEN 1000 MG: 10 INJECTION, SOLUTION INTRAVENOUS at 12:09

## 2022-09-21 RX ADMIN — MORPHINE SULFATE 4 MG: 4 INJECTION INTRAVENOUS at 06:09

## 2022-09-21 RX ADMIN — ALUMINUM HYDROXIDE, MAGNESIUM HYDROXIDE, AND DIMETHICONE 30 ML: 200; 20; 200 SUSPENSION ORAL at 06:09

## 2022-09-21 RX ADMIN — FENTANYL CITRATE 100 MCG: 50 INJECTION, SOLUTION INTRAMUSCULAR; INTRAVENOUS at 12:09

## 2022-09-21 RX ADMIN — ONDANSETRON 4 MG: 2 INJECTION INTRAMUSCULAR; INTRAVENOUS at 12:09

## 2022-09-21 RX ADMIN — IPRATROPIUM BROMIDE AND ALBUTEROL SULFATE 3 ML: .5; 3 SOLUTION RESPIRATORY (INHALATION) at 02:09

## 2022-09-21 RX ADMIN — SODIUM CHLORIDE, SODIUM GLUCONATE, SODIUM ACETATE, POTASSIUM CHLORIDE AND MAGNESIUM CHLORIDE: 526; 502; 368; 37; 30 INJECTION, SOLUTION INTRAVENOUS at 12:09

## 2022-09-21 RX ADMIN — ROCURONIUM BROMIDE 20 MG: 10 INJECTION, SOLUTION INTRAVENOUS at 12:09

## 2022-09-21 NOTE — TRANSFER OF CARE
Anesthesia Transfer of Care Note    Patient: Ethan Santa    Procedure(s) Performed: Procedure(s) (LRB):  CHOLECYSTECTOMY, LAPAROSCOPIC (N/A)    Patient location: PACU    Anesthesia Type: general    Transport from OR: Transported from OR on room air with adequate spontaneous ventilation    Post pain: adequate analgesia    Post assessment: no apparent anesthetic complications    Post vital signs: stable    Level of consciousness: awake and alert    Nausea/Vomiting: no nausea/vomiting    Complications: none    Transfer of care protocol was followed      Last vitals:   Visit Vitals  /78 (BP Location: Right arm, Patient Position: Sitting)   Pulse 62   Temp 36.7 °C (98.1 °F) (Temporal)   Resp 18   Ht 6' (1.829 m)   Wt 115.7 kg (255 lb)   SpO2 96%   BMI 34.58 kg/m²

## 2022-09-21 NOTE — DISCHARGE SUMMARY
[facility] General Surgery  Discharge Summary    Admit Date: 9/21/2022  Discharge Date: 9/21/2022  Admitting Physician: No att. providers found  Consulting Physicians(s): none    Admission HPI:   Ethan Santa is a 56 y.o. male with PMH significant for HTN presented to ED on 9/21/2022  with a primary complaint of Chest Pain (C/O CP ONSET 2200 LAST NIGHT. STATES HX OF HTN. DENIES SOB. )     Pain started on Sunday (9/18/22) evening after pt had a few beers and hamburger. Was dull and achy, started in the MUQ and radiated to the RUQ. Admits to one episode of N/V, was clear and food he ate prior. Pain went away after he fell asleep on Sunday. Cam back again last night (9/20/22) and has remained a 10/10 pain since then. Tolerated dinner last night, last bowel movement was last night as well, was formed and not loose or watery. Denied any smoking, illicit drugs, no other alcohol use except stated above.        Hospital Course:   Pt was admitted on 9/21 and was taken to the OR for a lap cholecystectomy. Tolerating pain and diet. Meeting discharge criteria.    Procedures Performed: lap cholecystectomy    Final Diagnoses:   Active Hospital Problems   No active problems to display.      Resolved Hospital Problems    Diagnosis Date Resolved POA    *Dysfunctional gallbladder [K82.8] 09/21/2022 Unknown       Discharge Condition: stable    Disposition: home      Discharge Medications:  Current Discharge Medication List        START taking these medications    Details   oxyCODONE (ROXICODONE) 5 MG immediate release tablet Take 1 tablet (5 mg total) by mouth every 4 (four) hours as needed for Pain.  Qty: 10 tablet, Refills: 0    Comments: Quantity prescribed more than 7 day supply? No           CONTINUE these medications which have NOT CHANGED    Details   amLODIPine (NORVASC) 10 MG tablet Take 1 tablet (10 mg total) by mouth once daily.  Qty: 90 tablet, Refills: 3    Comments: .  Associated Diagnoses: Primary hypertension       hydroCHLOROthiazide (HYDRODIURIL) 25 MG tablet Take 1 tablet (25 mg total) by mouth once daily.  Qty: 90 tablet, Refills: 3    Comments: .  Associated Diagnoses: Primary hypertension      nebivoloL (BYSTOLIC) 10 MG Tab Take 1 tablet (10 mg total) by mouth once daily.  Qty: 90 tablet, Refills: 3    Associated Diagnoses: Primary hypertension      NON FORMULARY MEDICATION OTC supplement for prostate health.  Epi Melo MD   [facility] General Surgery PGY1  09/21/2022 3:14 PM

## 2022-09-21 NOTE — ANESTHESIA PROCEDURE NOTES
Intubation    Date/Time: 9/21/2022 12:27 PM  Performed by: Scooter Syed CRNA  Authorized by: Gunjan Sousa MD     Intubation:     Induction:  Intravenous    Intubated:  Postinduction    Mask Ventilation:  Easy mask    Attempts:  1    Attempted By:  CRNA    Method of Intubation:  Video laryngoscopy (DL x 1 per TONY Lopez with MAC 4. Grade 4 view due to large epiglottis. DL X 2 with lopez per Dr. Sousa with grade 3 view. Palacios #4 used with grade 1 view and visualization of ETT past cords.)    Blade:  Kunal 4 (palacios)    Laryngeal View Grade: Grade I - full view of cords      Difficult Airway Encountered?: No      Complications:  None    Airway Device:  Oral endotracheal tube    Airway Device Size:  7.5    Style/Cuff Inflation:  Cuffed    Inflation Amount (mL):  7    Tube secured:  23    Secured at:  The lips    Placement Verified By:  Capnometry    Complicating Factors:  None    Findings Post-Intubation:  BS equal bilateral and atraumatic/condition of teeth unchanged

## 2022-09-21 NOTE — H&P
[facility] - Emergency Dept  General Surgery  History & Physical     Patient Name: Ethan Santa  MRN: [id number]  Admission Date: 9/21/2022  Attending Physician: Robinson Kelley MD  Primary Care Provider: Primary Doctor No  Date of Admit: 9/21/2022    Chief Complaint     Chest Pain (C/O CP ONSET 2200 LAST NIGHT. STATES HX OF HTN. DENIES SOB. )       Subjective:      History of Present Illness:  Ethan Santa is a 56 y.o. male with PMH significant for HTN presented to ED on 9/21/2022  with a primary complaint of Chest Pain (C/O CP ONSET 2200 LAST NIGHT. STATES HX OF HTN. DENIES SOB. )    Pain started on Sunday (9/18/22) evening after pt had a few beers and hamburger. Was dull and achy, started in the MUQ and radiated to the RUQ. Admits to one episode of N/V, was clear and food he ate prior. Pain went away after he fell asleep on Sunday. Cam back again last night (9/20/22) and has remained a 10/10 pain since then. Tolerated dinner last night, last bowel movement was last night as well, was formed and not loose or watery. Denied any smoking, illicit drugs, no other alcohol use except stated above.       Past Medical History:  Past Medical History:   Diagnosis Date    Diverticulitis     HTN (hypertension)        Past Surgical History:  Past Surgical History:   Procedure Laterality Date    benign breast biopsy      COLONOSCOPY  09/09/2021    repeat 5 years, Dr. Castellanos    Left Knee Scope       revised ACL    TONSILLECTOMY         Family History:  Family History   Problem Relation Age of Onset    Hypertension Mother     Gallbladder disease Father     Hypertension Father     Cataracts Sister     Diabetes Sister     Gallbladder disease Sister     Hypertension Brother        Social History:  Social History     Tobacco Use    Smoking status: Never    Smokeless tobacco: Never   Substance Use Topics    Alcohol use: Yes     Comment: Occasional etoh    Drug use: Never       Allergies:  Review of patient's  allergies indicates:   Allergen Reactions    Lisinopril Swelling       Home Medications:  Prior to Admission medications    Medication Sig Start Date End Date Taking? Authorizing Provider   amLODIPine (NORVASC) 10 MG tablet Take 1 tablet (10 mg total) by mouth once daily. 22  Cale Vail MD   hydroCHLOROthiazide (HYDRODIURIL) 25 MG tablet Take 1 tablet (25 mg total) by mouth once daily. 22  Cale Vail MD   nebivoloL (BYSTOLIC) 10 MG Tab Take 1 tablet (10 mg total) by mouth once daily. 22  Cale Vail MD   NON FORMULARY MEDICATION OTC supplement for prostate health.  Saw Palmetto and Cranberry    Historical Provider       Objective:   Last 24 Hour Vital Signs:  BP  Min: 115/62  Max: 163/81  Temp  Av.1 °F (36.7 °C)  Min: 98.1 °F (36.7 °C)  Max: 98.1 °F (36.7 °C)  Pulse  Av.6  Min: 57  Max: 98  Resp  Av  Min: 10  Max: 18  SpO2  Av.9 %  Min: 93 %  Max: 97 %  Height  Av' (182.9 cm)  Min: 6' (182.9 cm)  Max: 6' (182.9 cm)  Weight  Av.7 kg (255 lb)  Min: 115.7 kg (255 lb)  Max: 115.7 kg (255 lb)  Body mass index is 34.58 kg/m².  No intake/output data recorded.    Physical Examination:  Physical Exam  Constitutional:       General: He is not in acute distress.     Appearance: Normal appearance.   HENT:      Nose: No congestion.      Mouth/Throat:      Mouth: Mucous membranes are moist.   Cardiovascular:      Rate and Rhythm: Normal rate and regular rhythm.      Heart sounds: No murmur heard.  Pulmonary:      Effort: No respiratory distress.      Breath sounds: Normal breath sounds. No wheezing.   Abdominal:      General: Bowel sounds are normal. There is no distension.      Palpations: Abdomen is soft.      Tenderness: There is abdominal tenderness.      Comments: Mild guarding in the mid epigastric area   Musculoskeletal:         General: No swelling.   Skin:     General: Skin is warm and dry.       Laboratory:  Most Recent Data:  CBC:    Lab Results   Component Value Date    WBC 9.9 09/21/2022    HGB 16.3 09/21/2022    HCT 49.8 09/21/2022     09/21/2022    MCV 87.1 09/21/2022    RDW 13.6 09/21/2022     WBC Differential:   Recent Labs   Lab 09/21/22  0521   WBC 9.9   HGB 16.3   HCT 49.8      MCV 87.1     BMP:   Lab Results   Component Value Date     09/21/2022    K 4.4 09/21/2022    CO2 20 (L) 09/21/2022    BUN 15.9 09/21/2022    CREATININE 0.97 09/21/2022    CALCIUM 9.7 09/21/2022     LFTs:   Lab Results   Component Value Date    ALBUMIN 4.3 09/21/2022    BILITOT 0.6 09/21/2022    AST 20 09/21/2022    ALKPHOS 75 09/21/2022    ALT 26 09/21/2022     FLP:   Lab Results   Component Value Date    CHOL 140 07/25/2022    HDL 37 07/25/2022    TRIG 115 07/25/2022     DM:   Lab Results   Component Value Date    HGBA1C 6.7 07/25/2022    HGBA1C 5.6 07/16/2021    HGBA1C 5.8 06/25/2020    CREATININE 0.97 09/21/2022       Cardiac:   Lab Results   Component Value Date    TROPONINI <0.010 09/21/2022    BNP 41.4 09/21/2022     Urinalysis:   Lab Results   Component Value Date    COLORU Yellow 07/16/2021    PHUA 5.5 08/15/2022    KETONESU Negative 07/16/2021    UROBILINOGEN 0.2 08/15/2022    WBCUA 11-20 (A) 08/15/2022     Radiology:  Imaging Results              CT Abdomen Pelvis With Contrast (Final result)  Result time 09/21/22 07:45:56      Final result by Sathya Nicolas MD (09/21/22 07:45:56)                   Narrative:    EXAMINATION  CT ABDOMEN PELVIS WITH CONTRAST    CLINICAL HISTORY  Abdominal pain, acute, nonlocalized;diverticulitis suspected;    TECHNIQUE  Post-contrast helical-acquisition CT images were obtained and multiplanar reformats accomplished by a CT technologist at a separate workstation, pushed to PACS for physician review.    CONTRAST  *IV: ISOVUE-370, 100 mL  *Enteric: none    COMPARISON  None available at the time of initial interpretation.    FINDINGS  Images were reviewed in soft tissue, lung, and bone windows.    Exam  quality: adequate for evaluation    Lines/tubes: none visualized    Cardiopulmonary: Visualized heart chambers are normal volume.  No acute or focal abnormality of the included lower lung zones.  No significant pericardial or pleural fluid.    Hepatobiliary/Pancreas: No acute no Paddock abnormality or suspicious focal lesion.  There are multiple scattered hypodense structures statistically favored to represent benign cysts but otherwise too small for detailed characterization.  The gallbladder is moderately distended, with no definite focal mural irregularity or calcified intraluminal stone.  No convincing obstructive biliary process.  There is no evidence of expansile pancreatic lesion, ductal dilatation, or peripancreatic inflammatory changes.    Spleen: No acute or focal abnormality.    Adrenal/: No suspicious adrenal or renal lesion. No radiopaque urolithiasis or evidence of distal obstructive uropathy.  No focal bladder wall thickening or convincing intraluminal abnormality.  The prostate is enlarged and heterogeneous, with prominent median lobe impressing upon the bladder floor.  The gland measures 5.8 cm x 4.1 cm x 6.5 cm (approximately 81 cc).    Esophagus/GI tract: The included lower esophagus is unremarkable. No evidence of gastric outlet or small bowel obstruction. No acute inflammatory process or convincing focal lesion.    Peritoneal/Extraperitoneal Spaces: No free fluid or air, and no drainable collections.  Regional vascular structures are without abnormal dilatation or other focal irregularity.  No pathologic jessica enlargement or necrotic adenopathy.    Musculoskeletal: No acute or displaced osseous abnormality is identified.  There are degenerative alterations throughout the spinal column in bony pelvis.    IMPRESSION  1. No convincing CT evidence of acute abdominopelvic abnormality.  2. Nonspecific distention of the gallbladder lumen without visualized cholelithiasis or biliary obstruction.  3.  "Enlarged prostate with mass effect upon the bladder floor.    RADIATION DOSE  Automated tube current modulation, weight-based exposure dosing, and/or iterative reconstruction technique utilized to reach lowest reasonably achievable exposure rate.    DLP: 683 mGy*cm      Electronically signed by: Sathya Nicolas  Date:    09/21/2022  Time:    07:45                                      US Abdomen Limited_Gallbladder (Final result)  Result time 09/21/22 07:16:54      Final result by Sathya Nicolas MD (09/21/22 07:16:54)                   Narrative:    EXAMINATION  US ABDOMEN LIMITED_GALLBLADDER    CLINICAL HISTORY  acute epigastric pain;    TECHNIQUE  Multiplanar grayscale sonographic evaluation of the right upper quadrant was performed, with focused Doppler assessment of the main portal vein.    COMPARISON  None available at the time of initial interpretation.    FINDINGS  Exam quality: Limited secondary to regional bowel gas and associated "dirty shadowing" artifact.    Pancreas: Inadequately visualized.    Liver: Enlarged, craniocaudal length 18.2 cm.  No focal contour irregularity or nodular changes of the liver surface.  No expansile mass-like abnormality is identified.  There is an anechoic 1.4 cm structure at the peripheral right lobe with through acoustic enhancement, consistent with simple cyst.  Diffusely increased echogenicity of the background liver tissue is noted, in keeping with fatty infiltration.  Normal hepatopetal flow is seen within the portal vein.    Bile ducts: No intra-/extrahepatic biliary dilatation suggestive of obstructing pathology.    Gallbladder: The gallbladder is dilated, length 11 cm in diameter approximately 4.5 cm.  There is echogenic layering sludge within the dependent lumen.  No shadowing stones are visualized.  No wall thickening or pericholecystic fluid.  Sonographic Ferrara sign is negative, per the ultrasonographer's report.    Other findings: No suspicious right kidney lesion, " shadowing stone, or hydronephrosis.  The visualized aorta and IVC are unremarkable.  No free fluid identified.    IMPRESSION  Limited evaluation secondary to bowel gas artifact.  Within limitations:    1. Dilated gallbladder with layering sludge, no shadowing stone or sonographic evidence of acute cholecystitis.  Follow-up evaluation with nuclear medicine HIDA scan may prove beneficial, as element of chronic cholecystitis is not excluded.  2. Hepatomegaly with changes of steatosis.  3. Benign-appearing right hepatic cyst.  ==========    This report was flagged in Epic as abnormal.      Electronically signed by: Sathya Nicolas  Date:    09/21/2022  Time:    07:16                                     X-Ray Chest PA And Lateral (Final result)  Result time 09/21/22 07:22:27      Final result by Sathya Nicolas MD (09/21/22 07:22:27)                   Narrative:    EXAMINATION  XR CHEST PA AND LATERAL    CLINICAL HISTORY  Chest Pain;    TECHNIQUE  A total of 2 images submitted of the chest.    COMPARISON  None available at the time of initial interpretation.    FINDINGS  Lines/tubes/devices: none present    The cardiomediastinal silhouette and central pulmonary vasculature are unremarkable contour and size.  The trachea is midline. There is no lobar consolidation, pleural effusion, or pneumothorax.    There is no acute osseous or extrathoracic abnormality.    IMPRESSION  No convincing acute radiographic abnormality.      Electronically signed by: Sathya Nicolas  Date:    09/21/2022  Time:    07:22                                  Assessment & Plan:     Cholelithiasis   - Pt admitted to the floor   - Abdominal U/S showed dilated gallbladder with layering sludge, no shadowing stone or sonographic evidence of acute cholecystitis  - Pain has been persistent   - Plan to go to OR today/tomorrow   - Strict NPO   - Pain management until he can get to the OR      Mikhail Griffin MD  [facility]

## 2022-09-21 NOTE — OP NOTE
[facility] - Periop Services  Operative Note      Date of Procedure: 9/21/2022     Procedure: Procedure(s) (LRB):  CHOLECYSTECTOMY, LAPAROSCOPIC (N/A)     Surgeon(s) and Role:     * Robinson Kelley MD - Primary    Assisting Surgeon: Alexis Scheuermann, MD    Pre-Operative Diagnosis: Gallbladder disorder    Post-Operative Diagnosis: Acute acalculous cholecystitis    Anesthesia: Choice    Estimated Blood Loss (EBL): 10 mL           Specimens: Gallbladder      Description of Technical Procedures:  After informed consent was obtained, patient was brought to the operating room and placed in the supine position.  Next general endotracheal anesthesia was administered by member of the anesthesia team.  The abdomen was prepped and draped in sterile surgical fashion.  A small transverse supraumbilical incision was made with a 15 blade.  Access to the peritoneal cavity was achieved with an 11 mm Optiview trocar and a 10 mm 0 degree laparoscope.  Pneumoperitoneum was achieved.  Three additional 5 mm working trocars were placed along the right costal margin.  Patient was placed in reverse Trendelenburg left-side-down position.  Gallbladder was then grasped at the fundus and retracted cephalad towards the right shoulder.  A 2nd grasper was used to grasp the infundibulum and flag the gallbladder.  The peritoneum was incised with electrocautery and stripped down thus exposing Calot triangle.  Careful dissection was performed within the triangle.  Cystic duct and cystic artery were identified, skeletonized, and isolated.  Posterior aspect of the triangle was cleared to the liver thus creating the critical view.  Cystic duct and cystic artery were then sharply divided with Endoshears between surgical clips.  The gallbladder was then excised from the gallbladder fossa using the hook electrocautery.  Once excision was completed, gallbladder was placed within an Endo-Catch retrieval bag which was left in the  abdominal cavity until the end of the case.  Right upper quadrant was irrigated and suctioned.  Hemostasis was achieved with spot cautery.  Surgical clips were visualized and intact.  Patient was returned to the neutral position.  Pneumoperitoneum was released and working trocars were removed.  The Endo-Catch retrieval bag containing the gallbladder was removed through the umbilical incision and sent for permanent pathology.  The umbilical fascial defect was then repaired with a figure-of-eight 0 Vicryl suture.  All wounds were irrigated and the skin edges reapproximated with Monocryl suture in a subcuticular fashion.  Incisions were cleaned and sterile bandage applied.  Patient tolerated procedure well and there were no complications.  Patient was awakened extubated in the operating room then subsequently transferred to recovery in satisfactory condition.

## 2022-09-21 NOTE — BRIEF OP NOTE
[facility]  General Surgery  Brief Operative Note    Date of Procedure: 9/21/2022    Procedure: Laparoscopic cholecystectomy    Surgeon(s) and Role:  Staff: Robinson Kelley MD  Resident(s): Alexis Scheuermann, MD; Hilario Marcelino MD    Pre-Operative Diagnosis: Symptomatic cholelithiasis    Post-Operative Diagnosis: Same    Anesthesia: GETA    Operative Findings: Distended gallbladder w/ sludge but no stones    Description of Technical Procedures: Refer to full dictated operative report    Estimated Blood Loss (EBL): 10 cc           Implants: None    Drains: None    Specimens: Galbladder    Complications: None            Condition: Stable    Disposition: PACU    Alexis Scheuermann, MD   [facility] General Surgery, PGY4  9/21/2022 2:05 PM

## 2022-09-21 NOTE — ED PROVIDER NOTES
Encounter Date: 9/21/2022    SCRIBE #1 NOTE: IGeoffrey, am scribing for, and in the presence of,  Srinivasan Chamberlain MD. I have scribed the following portions of the note - the EKG reading. Other sections scribed: HPI, ROS, PE.     History     Chief Complaint   Patient presents with    Chest Pain     C/O CP ONSET 2200 LAST NIGHT. STATES HX OF HTN. DENIES SOB.      56 year old male with past medical history of HTN and diverticulitis presents to ED c/o upper abdominal pain. Pt reports that the pain is constant and feels like twisting radiating to his back. Pt denies nausea and hematochezia. Pt reports that he took ibuprofen for pain. Pt reports that he felt similar frankie Sunday after 4-5 beers but with associated symptom of nausea.  Pain started Sunday night few hours every 8 grilled dirt hers lasted a few hours and then resolved.  It recurred last night after he ate [employer] high it has been constant since then in the right upper epigastric and to lesser extent left upper quadrant.  There is no pain in his chest    PCP: Cale Vail MD       Abdominal Pain  Illness onset: 2200 last night. The onset of the illness was abrupt. The problem has not changed since onset.The abdominal pain is located in the epigastric region, RUQ and LUQ. The abdominal pain radiates to the back. The abdominal pain is relieved by nothing. The abdominal pain is exacerbated by NSAIDs. The other symptoms of the illness do not include fever, shortness of breath, nausea, vomiting, diarrhea or dysuria.   The illness is associated with NSAID use and alcohol use. Symptoms associated with the illness do not include chills or hematuria. Significant associated medical issues include diverticulitis.   Review of patient's allergies indicates:   Allergen Reactions    Lisinopril Swelling     Past Medical History:   Diagnosis Date    Diverticulitis     HTN (hypertension)      Past Surgical History:   Procedure Laterality Date    benign breast  biopsy      COLONOSCOPY  09/09/2021    repeat 5 years, Dr. Castellanos    Left Knee Scope       revised ACL    TONSILLECTOMY       Family History   Problem Relation Age of Onset    Hypertension Mother     Gallbladder disease Father     Hypertension Father     Cataracts Sister     Diabetes Sister     Gallbladder disease Sister     Hypertension Brother      Social History     Tobacco Use    Smoking status: Never    Smokeless tobacco: Never   Substance Use Topics    Alcohol use: Yes     Comment: Occasional etoh    Drug use: Never     Review of Systems   Constitutional:  Negative for chills and fever.   HENT:  Negative for sinus pain.    Respiratory:  Negative for cough, chest tightness and shortness of breath.    Cardiovascular:  Negative for chest pain.   Gastrointestinal:  Positive for abdominal pain. Negative for blood in stool, diarrhea, nausea and vomiting.   Genitourinary:  Negative for dysuria and hematuria.   Skin:  Negative for rash.   Neurological:  Negative for syncope and weakness.   All other systems reviewed and are negative.    Physical Exam     Initial Vitals [09/21/22 0451]   BP Pulse Resp Temp SpO2   (!) 157/91 (!) 57 17 98.1 °F (36.7 °C) 97 %      MAP       --         Physical Exam    Nursing note and vitals reviewed.  Constitutional: He appears well-developed and well-nourished. No distress.   HENT:   Head: Normocephalic and atraumatic.   Eyes: Conjunctivae are normal.   Pulmonary/Chest: No respiratory distress. He has no wheezes. He has no rhonchi. He exhibits no tenderness.   Abdominal: Abdomen is soft. He exhibits no distension. There is no abdominal tenderness.   RUQ, LUQ, and epigastric pain There is no rebound and no guarding.   Musculoskeletal:         General: Normal range of motion.     Neurological: He is alert and oriented to person, place, and time. He has normal strength.   Skin: Skin is warm and dry.   Psychiatric: He has a normal mood and affect.       ED Course   Procedures  Labs  Reviewed   COMPREHENSIVE METABOLIC PANEL - Abnormal; Notable for the following components:       Result Value    Carbon Dioxide 20 (*)     Glucose Level 121 (*)     Globulin 3.9 (*)     All other components within normal limits   CBC WITH DIFFERENTIAL - Abnormal; Notable for the following components:    MCHC 32.7 (*)     MPV 11.0 (*)     IG# 0.06 (*)     All other components within normal limits   B-TYPE NATRIURETIC PEPTIDE - Normal   TROPONIN I - Normal   PROTIME-INR - Normal   LIPASE - Normal   TROPONIN I - Normal   CBC W/ AUTO DIFFERENTIAL    Narrative:     The following orders were created for panel order CBC auto differential.  Procedure                               Abnormality         Status                     ---------                               -----------         ------                     CBC with Differential[[id number]]        Abnormal            Final result                 Please view results for these tests on the individual orders.     EKG Readings: (Independently Interpreted)   Initial Reading: No STEMI. Rhythm: Normal Sinus Rhythm. Heart Rate: 58 bpm. Ectopy: No Ectopy. Conduction: Normal. ST Segments: Normal ST Segments. T Waves: Normal. Axis: Normal.   Time: 0446     Imaging Results              CT Abdomen Pelvis With Contrast (Final result)  Result time 09/21/22 07:45:56      Final result by Sathya Nicolas MD (09/21/22 07:45:56)                   Narrative:    EXAMINATION  CT ABDOMEN PELVIS WITH CONTRAST    CLINICAL HISTORY  Abdominal pain, acute, nonlocalized;diverticulitis suspected;    TECHNIQUE  Post-contrast helical-acquisition CT images were obtained and multiplanar reformats accomplished by a CT technologist at a separate workstation, pushed to PACS for physician review.    CONTRAST  *IV: ISOVUE-370, 100 mL  *Enteric: none    COMPARISON  None available at the time of initial interpretation.    FINDINGS  Images were reviewed in soft tissue, lung, and bone windows.    Exam quality:  adequate for evaluation    Lines/tubes: none visualized    Cardiopulmonary: Visualized heart chambers are normal volume.  No acute or focal abnormality of the included lower lung zones.  No significant pericardial or pleural fluid.    Hepatobiliary/Pancreas: No acute no Paddock abnormality or suspicious focal lesion.  There are multiple scattered hypodense structures statistically favored to represent benign cysts but otherwise too small for detailed characterization.  The gallbladder is moderately distended, with no definite focal mural irregularity or calcified intraluminal stone.  No convincing obstructive biliary process.  There is no evidence of expansile pancreatic lesion, ductal dilatation, or peripancreatic inflammatory changes.    Spleen: No acute or focal abnormality.    Adrenal/: No suspicious adrenal or renal lesion. No radiopaque urolithiasis or evidence of distal obstructive uropathy.  No focal bladder wall thickening or convincing intraluminal abnormality.  The prostate is enlarged and heterogeneous, with prominent median lobe impressing upon the bladder floor.  The gland measures 5.8 cm x 4.1 cm x 6.5 cm (approximately 81 cc).    Esophagus/GI tract: The included lower esophagus is unremarkable. No evidence of gastric outlet or small bowel obstruction. No acute inflammatory process or convincing focal lesion.    Peritoneal/Extraperitoneal Spaces: No free fluid or air, and no drainable collections.  Regional vascular structures are without abnormal dilatation or other focal irregularity.  No pathologic jessica enlargement or necrotic adenopathy.    Musculoskeletal: No acute or displaced osseous abnormality is identified.  There are degenerative alterations throughout the spinal column in bony pelvis.    IMPRESSION  1. No convincing CT evidence of acute abdominopelvic abnormality.  2. Nonspecific distention of the gallbladder lumen without visualized cholelithiasis or biliary obstruction.  3. Enlarged  "prostate with mass effect upon the bladder floor.    RADIATION DOSE  Automated tube current modulation, weight-based exposure dosing, and/or iterative reconstruction technique utilized to reach lowest reasonably achievable exposure rate.    DLP: 683 mGy*cm      Electronically signed by: Sathya Nicolas  Date:    09/21/2022  Time:    07:45                                      US Abdomen Limited_Gallbladder (Final result)  Result time 09/21/22 07:16:54      Final result by Sathya Nicolas MD (09/21/22 07:16:54)                   Narrative:    EXAMINATION  US ABDOMEN LIMITED_GALLBLADDER    CLINICAL HISTORY  acute epigastric pain;    TECHNIQUE  Multiplanar grayscale sonographic evaluation of the right upper quadrant was performed, with focused Doppler assessment of the main portal vein.    COMPARISON  None available at the time of initial interpretation.    FINDINGS  Exam quality: Limited secondary to regional bowel gas and associated "dirty shadowing" artifact.    Pancreas: Inadequately visualized.    Liver: Enlarged, craniocaudal length 18.2 cm.  No focal contour irregularity or nodular changes of the liver surface.  No expansile mass-like abnormality is identified.  There is an anechoic 1.4 cm structure at the peripheral right lobe with through acoustic enhancement, consistent with simple cyst.  Diffusely increased echogenicity of the background liver tissue is noted, in keeping with fatty infiltration.  Normal hepatopetal flow is seen within the portal vein.    Bile ducts: No intra-/extrahepatic biliary dilatation suggestive of obstructing pathology.    Gallbladder: The gallbladder is dilated, length 11 cm in diameter approximately 4.5 cm.  There is echogenic layering sludge within the dependent lumen.  No shadowing stones are visualized.  No wall thickening or pericholecystic fluid.  Sonographic Ferrara sign is negative, per the ultrasonographer's report.    Other findings: No suspicious right kidney lesion, shadowing " stone, or hydronephrosis.  The visualized aorta and IVC are unremarkable.  No free fluid identified.    IMPRESSION  Limited evaluation secondary to bowel gas artifact.  Within limitations:    1. Dilated gallbladder with layering sludge, no shadowing stone or sonographic evidence of acute cholecystitis.  Follow-up evaluation with nuclear medicine HIDA scan may prove beneficial, as element of chronic cholecystitis is not excluded.  2. Hepatomegaly with changes of steatosis.  3. Benign-appearing right hepatic cyst.  ==========    This report was flagged in Epic as abnormal.      Electronically signed by: Sathya Nicolas  Date:    09/21/2022  Time:    07:16                                     X-Ray Chest PA And Lateral (Final result)  Result time 09/21/22 07:22:27      Final result by Sathya Nicolas MD (09/21/22 07:22:27)                   Narrative:    EXAMINATION  XR CHEST PA AND LATERAL    CLINICAL HISTORY  Chest Pain;    TECHNIQUE  A total of 2 images submitted of the chest.    COMPARISON  None available at the time of initial interpretation.    FINDINGS  Lines/tubes/devices: none present    The cardiomediastinal silhouette and central pulmonary vasculature are unremarkable contour and size.  The trachea is midline. There is no lobar consolidation, pleural effusion, or pneumothorax.    There is no acute osseous or extrathoracic abnormality.    IMPRESSION  No convincing acute radiographic abnormality.      Electronically signed by: Sathya Nicolas  Date:    09/21/2022  Time:    07:22                                     Medications   nitroGLYCERIN SL tablet 0.4 mg (0.4 mg Sublingual Given 9/21/22 0643)   aluminum-magnesium hydroxide-simethicone 200-200-20 mg/5 mL suspension 30 mL (30 mLs Oral Given 9/21/22 0602)     And   LIDOcaine HCl 2% oral solution 15 mL (15 mLs Oral Given 9/21/22 0602)   aspirin tablet 325 mg (325 mg Oral Given 9/21/22 0602)   nitroGLYCERIN SL tablet 0.4 mg (0.4 mg Sublingual Given 9/21/22 0629)    morphine injection 4 mg (4 mg Intravenous Given 9/21/22 0647)   ondansetron injection 4 mg (4 mg Intravenous Given 9/21/22 0647)   nitroGLYCERIN 2% TD oint ointment 1 inch (1 inch Transdermal Given 9/21/22 0653)   iopamidoL (ISOVUE-370) injection 100 mL (100 mLs Intravenous Given 9/21/22 0728)   HYDROmorphone (PF) injection 1 mg (1 mg Intravenous Given 9/21/22 0819)     Medical Decision Making:   Clinical Tests:   Lab Tests: Reviewed and Ordered  Radiological Study: Ordered and Reviewed        Scribe Attestation:   Scribe #1: I performed the above scribed service and the documentation accurately describes the services I performed. I attest to the accuracy of the note.    Attending Attestation:           Physician Attestation for Scribe:  Physician Attestation Statement for Scribe #1: I, reviewed documentation, as scribed by Geoffrey Wilkes in my presence, and it is both accurate and complete.           ED Course as of 09/21/22 1043   Wed Sep 21, 2022   0658 Cocktail was ordered after negative chest x-ray.  No relief with GI cocktail.  No relief with nitroglycerin.  Given morphine after the ultrasound of the gallbladder [LF]   0658 Lipase is normal LFTs normal no leukocytosis.  No still have pain in the upper abdomen.  Ultrasound shows a distended gallbladder with sludge no visible stones no evidence of cholecystitis.  Due to the continued nature of the pain and distention the gallbladder will discuss case with surgery to see if they really needs a cholecystectomy [LF]      ED Course User Index  [LF] Srinivasan Chamberlain MD            General surgery saw patient and will admit       Clinical Impression:   Final diagnoses:  [R10.13] Acute epigastric pain  [K82.8] Dysfunctional gallbladder      ED Disposition Condition    Admit Stable                Srinivasan Chamberlain MD  09/21/22 1044

## 2022-09-21 NOTE — ANESTHESIA PREPROCEDURE EVALUATION
09/21/2022  Ethan Santa is a 56 y.o., male.  with PMH significant for HTN presented to ED on 9/21/2022  with a primary complaint of Chest Pain (C/O CP ONSET 2200 LAST NIGHT. STATES HX OF HTN. DENIES SOB. )     Pain started on Sunday (9/18/22) evening after pt had a few beers and hamburger. Was dull and achy, started in the MUQ and radiated to the RUQ. Admits to one episode of N/V, was clear and food he ate prior. Pain went away after he fell asleep on Sunday. Cam back again last night (9/20/22) and has remained a 10/10 pain since then. Tolerated dinner last night, last bowel movement was last night as well, was formed and not loose or watery. Denied any smoking, illicit drugs, no other alcohol use except stated above.    U/S and CT show dil GB with sludge    Pre-op Assessment    I have reviewed the Patient Summary Reports.     I have reviewed the Nursing Notes. I have reviewed the NPO Status.   I have reviewed the Medications.     Review of Systems  Anesthesia Hx:  No problems with previous Anesthesia        Physical Exam  General: Well nourished, Cooperative, Alert and Oriented    Airway:  Mallampati: III / III  Mouth Opening: Normal  TM Distance: Normal  Tongue: Normal  Neck ROM: Normal ROM    Dental:  Intact    Heart:  Rate: Normal  Rhythm: Regular Rhythm        Anesthesia Plan  Type of Anesthesia, risks & benefits discussed:    Anesthesia Type: Gen ETT  Intra-op Monitoring Plan: Standard ASA Monitors  Post Op Pain Control Plan: multimodal analgesia  Induction:  IV and rapid sequence  Airway Plan: Direct, Post-Induction  Informed Consent: Informed consent signed with the Patient and all parties understand the risks and agree with anesthesia plan.  All questions answered. Patient consented to blood products? Yes  ASA Score: 2  Day of Surgery Review of History & Physical: H&P Update referred to the  Review of Systems   Constitutional: Negative  Negative for appetite change and fever  HENT: Negative  Negative for hearing loss, tinnitus, trouble swallowing and voice change  Eyes: Negative  Negative for photophobia and pain  Respiratory: Negative  Negative for shortness of breath  Cardiovascular: Negative  Negative for palpitations  Gastrointestinal: Negative  Negative for nausea and vomiting  Endocrine: Negative  Negative for cold intolerance  Genitourinary: Negative  Negative for dysuria, frequency and urgency  Musculoskeletal: Negative  Negative for myalgias and neck pain  Skin: Negative  Negative for rash  Neurological: Positive for numbness (and tingling to hands and fingers) and headaches (3 per month)  Negative for dizziness, tremors, seizures, syncope, facial asymmetry, speech difficulty, weakness and light-headedness  Hematological: Negative  Does not bruise/bleed easily  Psychiatric/Behavioral: Negative  Negative for confusion, hallucinations and sleep disturbance  surgeon/provider.    Ready For Surgery From Anesthesia Perspective.     .

## 2022-09-22 NOTE — ANESTHESIA POSTPROCEDURE EVALUATION
Anesthesia Post Evaluation    Patient: Ethan Santa    Procedure(s) Performed: Procedure(s) (LRB):  CHOLECYSTECTOMY, LAPAROSCOPIC (N/A)    Final Anesthesia Type: general      Patient location during evaluation: PACU  Patient participation: Yes- Able to Participate  Level of consciousness: awake and alert  Post-procedure vital signs: reviewed and stable  Pain management: adequate  Airway patency: patent    PONV status at discharge: No PONV  Anesthetic complications: no      Cardiovascular status: stable  Respiratory status: unassisted, spontaneous ventilation and face mask  Hydration status: euvolemic  Follow-up not needed.          Vitals Value Taken Time   /76 09/21/22 1608   Temp 37.2 °C (98.9 °F) 09/21/22 1654   Pulse 64 09/21/22 1629   Resp 15 09/21/22 1516   SpO2 94 % 09/21/22 1629   Vitals shown include unvalidated device data.      Event Time   Out of Recovery 15:20:00         Pain/Amilcar Score: Pain Rating Prior to Med Admin: 9 (9/21/2022  8:19 AM)  Amilcar Score: 10 (9/21/2022  4:42 PM)  Modified Amilcar Score: 19 (9/21/2022  4:42 PM)

## 2022-09-23 VITALS
BODY MASS INDEX: 34.54 KG/M2 | HEIGHT: 72 IN | HEART RATE: 64 BPM | DIASTOLIC BLOOD PRESSURE: 76 MMHG | SYSTOLIC BLOOD PRESSURE: 131 MMHG | WEIGHT: 255 LBS | OXYGEN SATURATION: 94 % | RESPIRATION RATE: 15 BRPM | TEMPERATURE: 99 F

## 2022-09-26 LAB
ESTROGEN SERPL-MCNC: NORMAL PG/ML
INSULIN SERPL-ACNC: NORMAL U[IU]/ML
LAB AP CLINICAL INFORMATION: NORMAL
LAB AP GROSS DESCRIPTION: NORMAL
LAB AP REPORT FOOTNOTES: NORMAL
T3RU NFR SERPL: NORMAL %

## 2022-10-05 ENCOUNTER — DOCUMENTATION ONLY (OUTPATIENT)
Dept: SURGERY | Facility: HOSPITAL | Age: 57
End: 2022-10-05
Payer: COMMERCIAL

## 2022-10-05 NOTE — PROGRESS NOTES
[facility] ACUTE CARE NOTE    Date of surgery: 9/21/2022  Procedure: Laparoscopic cholecystectomy  Surgeon: Dr. Allie Kimneth Kiet is doing well. Has no complaints. Reports his incision sites have healed up. Tolerating a diet. Having bowel movements. Denies fever, chills, nausea, vomiting, and changes in bowel movement. Pathology reviewed. Pathology report (see below) was discussed with the patient. Post-op care discussed. All questions were answered. Recommended to call our clinic as needed with any concerns. Follow up with primary care provider - Dr. Vail.    Pathology:  Final Diagnosis   GALLBLADDER, CHOLECYSTECTOMY:     ACUTE AND CHRONIC CHOLECYSTITIS.     FOCAL MUCOSAL CHOLESTEROLOSIS.     CODE 6      Electronically signed by Arun Cardona MD on 9/26/2022 at 0702     Julianna Mcknight PA-C  [facility] Acute [facility] [place] [facility]   10/05/2022 11:31 AM

## 2022-10-24 PROBLEM — Z00.00 ENCOUNTER FOR WELLNESS EXAMINATION IN ADULT: Status: RESOLVED | Noted: 2022-07-23 | Resolved: 2022-10-24

## 2022-10-25 PROBLEM — E11.9 DIET-CONTROLLED DIABETES MELLITUS: Status: ACTIVE | Noted: 2022-10-25

## 2023-07-26 PROBLEM — R73.9 HYPERGLYCEMIA: Status: RESOLVED | Noted: 2022-07-23 | Resolved: 2023-07-26

## 2023-07-27 PROCEDURE — 87389 HIV-1 AG W/HIV-1&-2 AB AG IA: CPT | Performed by: FAMILY MEDICINE

## 2023-07-27 PROCEDURE — 86803 HEPATITIS C AB TEST: CPT | Performed by: FAMILY MEDICINE

## 2023-10-30 PROBLEM — Z00.00 ENCOUNTER FOR WELLNESS EXAMINATION IN ADULT: Status: RESOLVED | Noted: 2022-07-23 | Resolved: 2023-10-30

## (undated) DEVICE — CORD LAP 10 DISP

## (undated) DEVICE — SCISSOR CURVED ENDOPATH 5MM

## (undated) DEVICE — KIT SURGICAL TURNOVER

## (undated) DEVICE — IRRIGATOR HYDRO-SURG PLUS 5MM

## (undated) DEVICE — SUT VICRYL+ 27 UR-6 VIOL

## (undated) DEVICE — CANNULA ENDOPATH XCEL 5X100MM

## (undated) DEVICE — DRAPE SLUSH WARMER 66X44IN

## (undated) DEVICE — GLOVE PROTEXIS LTX MICRO  7.5

## (undated) DEVICE — BAG SPEC RETRV ENDO 4X6IN DISP

## (undated) DEVICE — ELECTRODE PATIENT RETURN DISP

## (undated) DEVICE — SOL IRRI STRL WATER 1000ML

## (undated) DEVICE — NDL HYPO 22GX1 1/2 SYR 10ML LL

## (undated) DEVICE — APPLIER CLIP ENDO LIGAMAX 5MM

## (undated) DEVICE — NDL HYPO REG 25G X 1 1/2

## (undated) DEVICE — CHLORAPREP W TINT 26ML APPL

## (undated) DEVICE — TROCAR ENDOPATH XCEL 11MM 10CM

## (undated) DEVICE — SUT MCRYL PLUS 4-0 PS2 27IN

## (undated) DEVICE — TROCAR ENDOPATH XCEL 5X100MM

## (undated) DEVICE — KIT GEN LAPAROSCOPY LAFAYETTE

## (undated) DEVICE — SUT MCRYL PLUS 3-0 PS2 27IN

## (undated) DEVICE — ADHESIVE DERMABOND ADVANCED

## (undated) DEVICE — GLOVE PROTEXIS BLUE LATEX 8

## (undated) DEVICE — SOL NACL IRR 1000ML BTL

## (undated) DEVICE — CLOSURE SKIN STERI STRIP 1/2X4